# Patient Record
Sex: FEMALE | Race: BLACK OR AFRICAN AMERICAN | Employment: FULL TIME | URBAN - METROPOLITAN AREA
[De-identification: names, ages, dates, MRNs, and addresses within clinical notes are randomized per-mention and may not be internally consistent; named-entity substitution may affect disease eponyms.]

---

## 2017-02-13 ENCOUNTER — OFFICE VISIT (OUTPATIENT)
Dept: FAMILY MEDICINE CLINIC | Age: 42
End: 2017-02-13

## 2017-02-13 VITALS
OXYGEN SATURATION: 96 % | DIASTOLIC BLOOD PRESSURE: 74 MMHG | HEART RATE: 109 BPM | HEIGHT: 63 IN | SYSTOLIC BLOOD PRESSURE: 138 MMHG | BODY MASS INDEX: 27.11 KG/M2 | WEIGHT: 153 LBS

## 2017-02-13 DIAGNOSIS — F41.9 ANXIETY AND DEPRESSION: ICD-10-CM

## 2017-02-13 DIAGNOSIS — Z00.00 HEALTHCARE MAINTENANCE: Primary | ICD-10-CM

## 2017-02-13 DIAGNOSIS — F98.8 ADD (ATTENTION DEFICIT DISORDER): ICD-10-CM

## 2017-02-13 DIAGNOSIS — N89.8 VAGINAL DISCHARGE: ICD-10-CM

## 2017-02-13 DIAGNOSIS — B00.9 HERPES: ICD-10-CM

## 2017-02-13 DIAGNOSIS — F32.A ANXIETY AND DEPRESSION: ICD-10-CM

## 2017-02-13 DIAGNOSIS — Q18.1: ICD-10-CM

## 2017-02-13 DIAGNOSIS — N94.9 VAGINAL DISCOMFORT: ICD-10-CM

## 2017-02-13 DIAGNOSIS — Z00.00 HEALTHCARE MAINTENANCE: ICD-10-CM

## 2017-02-13 DIAGNOSIS — E55.9 VITAMIN D DEFICIENCY: ICD-10-CM

## 2017-02-13 DIAGNOSIS — N63.0 BREAST LUMP: ICD-10-CM

## 2017-02-13 DIAGNOSIS — M79.645 PAIN OF FINGER OF LEFT HAND: ICD-10-CM

## 2017-02-13 DIAGNOSIS — I10 ESSENTIAL HYPERTENSION: ICD-10-CM

## 2017-02-13 LAB
BILIRUBIN, POC: NORMAL
BLOOD URINE, POC: NORMAL
CLARITY, POC: CLEAR
COLOR, POC: NORMAL
GLUCOSE URINE, POC: NORMAL
KETONES, POC: NORMAL
LEUKOCYTE EST, POC: NORMAL
NITRITE, POC: NORMAL
PH, POC: 5
PROTEIN, POC: NORMAL
SPECIFIC GRAVITY, POC: 1.03
UROBILINOGEN, POC: 0.2

## 2017-02-13 PROCEDURE — 81002 URINALYSIS NONAUTO W/O SCOPE: CPT | Performed by: FAMILY MEDICINE

## 2017-02-13 PROCEDURE — 99396 PREV VISIT EST AGE 40-64: CPT | Performed by: FAMILY MEDICINE

## 2017-02-13 PROCEDURE — 81003 URINALYSIS AUTO W/O SCOPE: CPT | Performed by: FAMILY MEDICINE

## 2017-02-13 RX ORDER — ERGOCALCIFEROL 1.25 MG/1
50000 CAPSULE ORAL WEEKLY
COMMUNITY

## 2017-02-13 RX ORDER — HYDROCHLOROTHIAZIDE 12.5 MG/1
12.5 TABLET ORAL DAILY
Qty: 90 TABLET | Refills: 2 | Status: SHIPPED | OUTPATIENT
Start: 2017-02-13 | End: 2018-08-14 | Stop reason: SDUPTHER

## 2017-02-13 RX ORDER — FLUCONAZOLE 150 MG/1
150 TABLET ORAL ONCE
Qty: 2 TABLET | Refills: 0 | Status: SHIPPED | OUTPATIENT
Start: 2017-02-13 | End: 2017-02-13

## 2017-02-14 LAB
ANION GAP SERPL CALCULATED.3IONS-SCNC: 17 MMOL/L (ref 3–16)
BACTERIA: ABNORMAL /HPF
BILIRUBIN URINE: NEGATIVE
BLOOD, URINE: ABNORMAL
BUN BLDV-MCNC: 10 MG/DL (ref 7–20)
CALCIUM SERPL-MCNC: 9.1 MG/DL (ref 8.3–10.6)
CHLORIDE BLD-SCNC: 101 MMOL/L (ref 99–110)
CHOLESTEROL, TOTAL: 140 MG/DL (ref 0–199)
CLARITY: ABNORMAL
CO2: 23 MMOL/L (ref 21–32)
COLOR: ABNORMAL
COMMENT UA: ABNORMAL
CREAT SERPL-MCNC: 0.7 MG/DL (ref 0.6–1.1)
EPITHELIAL CELLS, UA: 23 /HPF (ref 0–5)
GFR AFRICAN AMERICAN: >60
GFR NON-AFRICAN AMERICAN: >60
GLUCOSE BLD-MCNC: 87 MG/DL (ref 70–99)
GLUCOSE URINE: NEGATIVE MG/DL
HDLC SERPL-MCNC: 47 MG/DL (ref 40–60)
HYALINE CASTS: 10 /HPF (ref 0–8)
KETONES, URINE: NEGATIVE MG/DL
LDL CHOLESTEROL CALCULATED: 79 MG/DL
LEUKOCYTE ESTERASE, URINE: ABNORMAL
MICROSCOPIC EXAMINATION: YES
NITRITE, URINE: NEGATIVE
PH UA: 5.5
POTASSIUM SERPL-SCNC: 4.4 MMOL/L (ref 3.5–5.1)
PROTEIN UA: ABNORMAL MG/DL
RBC UA: 6 /HPF (ref 0–4)
SODIUM BLD-SCNC: 141 MMOL/L (ref 136–145)
SPECIFIC GRAVITY UA: >1.03
TRIGL SERPL-MCNC: 70 MG/DL (ref 0–150)
URINE TYPE: ABNORMAL
UROBILINOGEN, URINE: 0.2 E.U./DL
VITAMIN D 25-HYDROXY: 38.5 NG/ML
VLDLC SERPL CALC-MCNC: 14 MG/DL
WBC UA: 8 /HPF (ref 0–5)

## 2017-02-15 LAB — URINE CULTURE, ROUTINE: NORMAL

## 2017-02-16 DIAGNOSIS — R31.29 MICROSCOPIC HEMATURIA: Primary | ICD-10-CM

## 2017-02-21 ENCOUNTER — TELEPHONE (OUTPATIENT)
Dept: FAMILY MEDICINE CLINIC | Age: 42
End: 2017-02-21

## 2017-02-21 DIAGNOSIS — N64.4 MASTALGIA: Primary | ICD-10-CM

## 2017-02-21 DIAGNOSIS — N63.0 BREAST LUMP: ICD-10-CM

## 2017-02-21 DIAGNOSIS — Z00.00 HEALTHCARE MAINTENANCE: ICD-10-CM

## 2017-03-10 ENCOUNTER — HOSPITAL ENCOUNTER (OUTPATIENT)
Dept: ULTRASOUND IMAGING | Age: 42
Discharge: HOME | End: 2017-03-10
Attending: FAMILY MEDICINE | Admitting: FAMILY MEDICINE

## 2017-03-10 DIAGNOSIS — N63.0 BREAST LUMP: ICD-10-CM

## 2017-03-10 DIAGNOSIS — N64.4 MASTALGIA: ICD-10-CM

## 2017-03-10 DIAGNOSIS — R52 PAIN: ICD-10-CM

## 2017-07-20 LAB
ALBUMIN SERPL-MCNC: 4.1 G/DL
ALP BLD-CCNC: 48 U/L
ALT SERPL-CCNC: 10 U/L
ANION GAP SERPL CALCULATED.3IONS-SCNC: NORMAL MMOL/L
AST SERPL-CCNC: 16 U/L
BASOPHILS ABSOLUTE: NORMAL /ΜL
BASOPHILS RELATIVE PERCENT: NORMAL %
BILIRUB SERPL-MCNC: 0.3 MG/DL (ref 0.1–1.4)
BUN BLDV-MCNC: 18 MG/DL
CALCIUM SERPL-MCNC: 8.7 MG/DL
CHLORIDE BLD-SCNC: 101 MMOL/L
CHOLESTEROL, TOTAL: 153 MG/DL
CHOLESTEROL/HDL RATIO: 2.8
CO2: NORMAL MMOL/L
CREAT SERPL-MCNC: 12 MG/DL
EOSINOPHILS ABSOLUTE: NORMAL /ΜL
EOSINOPHILS RELATIVE PERCENT: NORMAL %
GFR CALCULATED: 124
GLUCOSE BLD-MCNC: 97 MG/DL
HCT VFR BLD CALC: 39.5 % (ref 36–46)
HDLC SERPL-MCNC: 54 MG/DL (ref 35–70)
HEMOGLOBIN: 12.6 G/DL (ref 12–16)
LDL CHOLESTEROL CALCULATED: 86 MG/DL (ref 0–160)
LYMPHOCYTES ABSOLUTE: NORMAL /ΜL
LYMPHOCYTES RELATIVE PERCENT: NORMAL %
MCH RBC QN AUTO: 31 PG
MCHC RBC AUTO-ENTMCNC: 31.9 G/DL
MCV RBC AUTO: 97 FL
MONOCYTES ABSOLUTE: NORMAL /ΜL
MONOCYTES RELATIVE PERCENT: NORMAL %
NEUTROPHILS ABSOLUTE: NORMAL /ΜL
NEUTROPHILS RELATIVE PERCENT: NORMAL %
PLATELET # BLD: 223 K/ΜL
PMV BLD AUTO: NORMAL FL
POTASSIUM SERPL-SCNC: 3.9 MMOL/L
RBC # BLD: 4.07 10^6/ΜL
SODIUM BLD-SCNC: 141 MMOL/L
TOTAL PROTEIN: 7.6
TRIGL SERPL-MCNC: 64 MG/DL
VLDLC SERPL CALC-MCNC: 13 MG/DL
WBC # BLD: 4.9 10^3/ML

## 2018-08-14 ENCOUNTER — TELEPHONE (OUTPATIENT)
Dept: FAMILY MEDICINE CLINIC | Age: 43
End: 2018-08-14

## 2018-08-14 ENCOUNTER — OFFICE VISIT (OUTPATIENT)
Dept: FAMILY MEDICINE CLINIC | Age: 43
End: 2018-08-14

## 2018-08-14 VITALS
BODY MASS INDEX: 29.38 KG/M2 | SYSTOLIC BLOOD PRESSURE: 136 MMHG | HEART RATE: 66 BPM | WEIGHT: 165.8 LBS | DIASTOLIC BLOOD PRESSURE: 96 MMHG | HEIGHT: 63 IN | OXYGEN SATURATION: 97 %

## 2018-08-14 DIAGNOSIS — H61.21 IMPACTED CERUMEN OF RIGHT EAR: ICD-10-CM

## 2018-08-14 DIAGNOSIS — I10 ESSENTIAL HYPERTENSION: ICD-10-CM

## 2018-08-14 DIAGNOSIS — F32.A ANXIETY AND DEPRESSION: ICD-10-CM

## 2018-08-14 DIAGNOSIS — F98.8 ATTENTION DEFICIT DISORDER, UNSPECIFIED HYPERACTIVITY PRESENCE: ICD-10-CM

## 2018-08-14 DIAGNOSIS — F41.9 ANXIETY AND DEPRESSION: ICD-10-CM

## 2018-08-14 DIAGNOSIS — M79.7 FIBROMYALGIA: ICD-10-CM

## 2018-08-14 DIAGNOSIS — Z00.00 HEALTHCARE MAINTENANCE: Primary | ICD-10-CM

## 2018-08-14 PROCEDURE — 99396 PREV VISIT EST AGE 40-64: CPT | Performed by: FAMILY MEDICINE

## 2018-08-14 RX ORDER — HYDROCHLOROTHIAZIDE 12.5 MG/1
12.5 TABLET ORAL DAILY
Qty: 90 TABLET | Refills: 1 | Status: SHIPPED | OUTPATIENT
Start: 2018-08-14 | End: 2018-09-13 | Stop reason: ALTCHOICE

## 2018-08-14 RX ORDER — NABUMETONE 750 MG/1
TABLET, FILM COATED ORAL
Refills: 2 | COMMUNITY
Start: 2018-08-13 | End: 2018-09-13 | Stop reason: ALTCHOICE

## 2018-08-14 RX ORDER — LORAZEPAM 0.5 MG/1
0.5 TABLET ORAL EVERY 6 HOURS PRN
Qty: 20 TABLET | Refills: 0 | Status: SHIPPED | OUTPATIENT
Start: 2018-08-14 | End: 2020-11-30 | Stop reason: SDUPTHER

## 2018-08-14 ASSESSMENT — PATIENT HEALTH QUESTIONNAIRE - PHQ9
SUM OF ALL RESPONSES TO PHQ QUESTIONS 1-9: 2
2. FEELING DOWN, DEPRESSED OR HOPELESS: 1
SUM OF ALL RESPONSES TO PHQ9 QUESTIONS 1 & 2: 2
1. LITTLE INTEREST OR PLEASURE IN DOING THINGS: 1
SUM OF ALL RESPONSES TO PHQ QUESTIONS 1-9: 2

## 2018-08-14 NOTE — TELEPHONE ENCOUNTER
Patients blood pressure has been running high lately. Yesterday she had a BP reading of 152/112 while at her rheumatologist. She monitors her BP at home as well and she says that the \"bottom number keeps creeping up. \" Denies chest pain or shortness of breath, but does feel lightheaded. She is currently out of her medication and is requesting either an earlier appointment or a 1 week supply of hydrochlorothiazide, although she does not feel like it has been working. She is currently scheduled for 8/21. Please advise.     CB: [phone]

## 2018-08-14 NOTE — PROGRESS NOTES
LORazepam (ATIVAN) 0.5 MG tablet Take 1 tablet by mouth every 6 hours as needed for Anxiety for up to 30 days. . 20 tablet 0    hydrochlorothiazide (HYDRODIURIL) 12.5 MG tablet Take 1 tablet by mouth daily 90 tablet 1    famciclovir (FAMVIR) 500 MG tablet TAKE 1 TABLET BY MOUTH ONE TIME A DAY  30 tablet 2    vitamin D (ERGOCALCIFEROL) 99104 UNITS CAPS capsule Take 50,000 Units by mouth once a week      fluticasone (FLONASE) 50 MCG/ACT nasal spray 2 sprays by Nasal route daily. 1 Bottle 11    pseudoephedrine (SUDAFED 12 HOUR) 120 MG CR tablet Take 1 tablet by mouth 2 times daily as needed for Congestion. 30 tablet 1    IBUPROFEN PO Take  by mouth. Past Medical History:   Diagnosis Date    Anxiety     Depression     Genital herpes     Hypertension     Infertility, female     has looked into adoption in the past, hx of evaluation but no hx of IVF    Rash     Right tennis elbow     f/u with ortho     No past surgical history on file. Family History   Problem Relation Age of Onset    Diabetes Maternal Grandmother      Social History     Social History    Marital status:      Spouse name: N/A    Number of children: N/A    Years of education: N/A     Occupational History    Not on file. Social History Main Topics    Smoking status: Never Smoker    Smokeless tobacco: Never Used    Alcohol use Yes      Comment: occasional    Drug use: No    Sexual activity: Not on file     Other Topics Concern    Not on file     Social History Narrative    03/19/2014     back home and no longer working. Works at [employer].  works out of town at [employer] and comes home for short periods of times. Review of Systems:  A comprehensive review of systems was negative except for what was noted in the HPI.      Physical Exam:   Vitals:    08/14/18 1154   BP: (!) 136/90   Site: Left Arm   Position: Sitting   Cuff Size: Medium Adult   Pulse: 66   SpO2: 97%   Weight: 165 lb 12.8 oz (75.2 kg)   Height: 5' 3\" (1.6 m)     Body mass index is 29.37 kg/m². Constitutional: She is oriented to person, place, and time. She appears well-developed and well-nourished. No distress. HEENT:   Head: Normocephalic and atraumatic. Right Ear: + R cerumen impaction  Left Ear: Tympanic membrane, external ear and ear canal normal.   Nose: Nose normal.   Mouth/Throat: Oropharynx is clear and moist, and mucous membranes are normal.  There is no cervical adenopathy. Eyes: Conjunctivae and extraocular motions are normal. Pupils are equal, round, and reactive to light. Neck: Neck supple. No mass and no thyromegaly present. Cardiovascular: Normal rate, regular rhythm, normal heart sounds. Exam reveals no gallop and no friction rub. No murmur heard. Pulmonary/Chest: Effort normal and breath sounds normal. No respiratory distress. She has no wheezes, rhonchi or rales. Breast exam: fibrocystic changes bilateral breast R more then L. No axillary LAD. Abdominal: Soft, non-tender. Bowel sounds are normal. She exhibits no palpable organomegaly or mass. Musculoskeletal: Normal range of motion. She exhibits no edema. Neurological: She is alert and oriented. No cranial nerve deficit. Coordination normal.   Skin: Skin is warm and dry. There is no rash or erythema. Psychiatric: She has a normal mood and affect. Her speech is normal and behavior is normal. Judgment, cognition and memory are normal.       Assessment/Plan     1. Healthcare maintenance  Annual physical exam done today. Counseled on preventative care and a healthy lifestlye. Discussed optimal calcium intake emphasizing the importance of getting as much of the daily calcium requirement as possible from dietary sources. Immunization history reviewed. Cont f/u with GYN. 2. Essential hypertension  Restart HCTZ. Monitor.  Discussed risks, benefits, and potential side effects of medication and patient demonstrates understanding.    -

## 2018-08-14 NOTE — PATIENT INSTRUCTIONS
maintaining bone mass. Vitamin D and magnesium are also needed to aid in calcium absorption. Although you must work at it, it is possible to get adequate amounts of calcium from your diet. Dairy products are the best dietary sources of calcium. Other good sources include tofu, salmon (canned with edible bones), and blackstrap molasses. If you cannot or do not regularly get enough calcium from your diet, you may need to take a calcium supplement. However, supplements should be used only to supplement the calcium in your diet, not replace it. There are several different calcium compounds on the market. They differ mainly in price and how easily they are absorbed. Be sure to discuss calcium supplementation with your doctor. General recommendations are:   · Women   ¨ 19-50     1,000 milligrams (mg)   ¨ 51 and older    1,200 mg   · Men   ¨ 19-50    1,000 mg   ¨ 51-70    1,000 mg   ¨ Over 70    1,200 mg   Vitamin D is also important. . Our usual source of vitamin D is sunlight. But, with so many of us spending abundant time indoors (and wisely using sunscreen when outdoors), few people get adequate sun exposureespecially during the wintertime. [place] and other types of fish (like mackeral and sardines) are good vitamin D sources. Also, adding vitamin D-fortified milk to your diet will help you meet your daily needs. . The recommendation for most people (aged 1-70 years) is to get 600 international units (IU) of vitamin D each day. If you want to check on your vitamin D status, talk to your doctor, who will be able to do a blood test.   Exercise   Exercise is an important contributor to building and maintaining bone mass at all ages. It also increases the strength and coordination of muscles that support the bones. Weight-bearing exercise , such as walking , jogging, stair climbing, jumping rope, and dancing, is the best for your bones.  Weight lifting has also been shown to help strengthen bones and prevent

## 2018-09-07 ENCOUNTER — TELEPHONE (OUTPATIENT)
Dept: FAMILY MEDICINE CLINIC | Age: 43
End: 2018-09-07

## 2018-09-07 RX ORDER — AMLODIPINE BESYLATE 5 MG/1
5 TABLET ORAL DAILY
Qty: 30 TABLET | Refills: 0 | Status: SHIPPED | OUTPATIENT
Start: 2018-09-07 | End: 2018-10-08 | Stop reason: SDUPTHER

## 2018-09-07 NOTE — TELEPHONE ENCOUNTER
Pt was seen 8/14 and given hydrochlorothiazide (HYDRODIURIL) 12.5 MG tablet   Pt says BP is still 150/100, pt was told to go to ED with symptoms of Headache/SOB dizziness or worsening BP numbers  Pt was advised Dr. Kapil Hernandez until Monday  Cb# [phone]

## 2018-09-07 NOTE — TELEPHONE ENCOUNTER
[phone] ([place])   Left  w/ detailed instructions for patient. Asked patient to call back to confirm message was received and to schedule f/up appt for next week.

## 2018-09-11 LAB
HPV COMMENT: NORMAL
HPV TYPE 16: NOT DETECTED
HPV TYPE 18: NOT DETECTED
HPVOH (OTHER TYPES): NOT DETECTED

## 2018-09-13 ENCOUNTER — OFFICE VISIT (OUTPATIENT)
Dept: FAMILY MEDICINE CLINIC | Age: 43
End: 2018-09-13

## 2018-09-13 VITALS
DIASTOLIC BLOOD PRESSURE: 80 MMHG | OXYGEN SATURATION: 98 % | SYSTOLIC BLOOD PRESSURE: 130 MMHG | HEART RATE: 79 BPM | BODY MASS INDEX: 29.16 KG/M2 | HEIGHT: 63 IN | WEIGHT: 164.6 LBS

## 2018-09-13 DIAGNOSIS — R07.89 CHEST TIGHTNESS: ICD-10-CM

## 2018-09-13 DIAGNOSIS — F41.9 ANXIETY AND DEPRESSION: ICD-10-CM

## 2018-09-13 DIAGNOSIS — I10 ESSENTIAL HYPERTENSION: Primary | ICD-10-CM

## 2018-09-13 DIAGNOSIS — F32.A ANXIETY AND DEPRESSION: ICD-10-CM

## 2018-09-13 DIAGNOSIS — M79.7 FIBROMYALGIA: ICD-10-CM

## 2018-09-13 DIAGNOSIS — F98.8 ATTENTION DEFICIT DISORDER, UNSPECIFIED HYPERACTIVITY PRESENCE: ICD-10-CM

## 2018-09-13 PROCEDURE — 99214 OFFICE O/P EST MOD 30 MIN: CPT | Performed by: FAMILY MEDICINE

## 2018-09-13 RX ORDER — MELOXICAM 15 MG/1
15 TABLET ORAL
COMMUNITY
Start: 2018-08-22 | End: 2020-11-30

## 2018-09-13 RX ORDER — MILNACIPRAN HYDROCHLORIDE 12.5-25-5
KIT ORAL
COMMUNITY
Start: 2018-08-30 | End: 2018-11-26 | Stop reason: ALTCHOICE

## 2018-09-13 RX ORDER — SERTRALINE HYDROCHLORIDE 25 MG/1
12.5-25 TABLET, FILM COATED ORAL DAILY
Qty: 30 TABLET | Refills: 3 | Status: SHIPPED | OUTPATIENT
Start: 2018-09-13 | End: 2019-01-14 | Stop reason: SDUPTHER

## 2018-09-13 NOTE — PROGRESS NOTES
Can restart the HCTZ and continue the norvasc. Risks of low bp discussed. Follow up if symptoms worsen or fail to improve. 2. Anxiety and depression  Reviewed note from patient's psychologist back in 2016. They recommended starting with trial of a medication for anxiety and depression prior to medication for ADD. Pt will try zoloft. Discussed risks, benefits, and potential side effects of medication and patient demonstrates understanding. She is fearful of the start up side effects so will start 12.5mg daily and then go up to 25 mg daily. discused may need a higher dose. She will also try to get in with psych.    - Ambulatory referral to Psychiatry    3. Attention deficit disorder, unspecified hyperactivity presence  See above. - Ambulatory referral to Psychiatry    4. Chest tightness  Declined an EKG. Return precautions reviewed. Go to ER or fu if symptoms recur. 5. Fibromyalgia  Continue rheum fu. Discussed medications with patient, who voiced understanding of their use and indications. All questions answered. Return in about 2 months (around 11/13/2018) for Chronic conditions, HTN.

## 2018-10-08 RX ORDER — AMLODIPINE BESYLATE 5 MG/1
5 TABLET ORAL DAILY
Qty: 30 TABLET | Refills: 0 | Status: SHIPPED | OUTPATIENT
Start: 2018-10-08 | End: 2018-10-24 | Stop reason: SDUPTHER

## 2018-10-24 NOTE — TELEPHONE ENCOUNTER
Medication:   Requested Prescriptions     Pending Prescriptions Disp Refills    amLODIPine (NORVASC) 5 MG tablet [Pharmacy Med Name: AMLODIPINE BESYLATE 5 MG TAB] 30 tablet 0     Sig: TAKE 1 TABLET BY MOUTH EVERY DAY        Last Filled:  10.08.2018 #30    Patient Phone Number: [phone] (home)      Last appt: 9/13/2018  Return in about 2 months (around 11/13/2018) for Chronic conditions, HTN. Next appt: 11.15.2018    Last OARRS:   RX Monitoring 9/13/2018   Attestation The Prescription Monitoring Report for this patient was reviewed today. Documentation Possible medication side effects, risk of tolerance/dependence & alternative treatments discussed. ;No signs of potential drug abuse or diversion identified.        Preferred Pharmacy:   [facility]/pharmacy [address] [place] [address],  Albertoflor [address] [place] [phone]  [address] [place] [address]  Phone: [phone] Fax: [phone]

## 2018-10-25 RX ORDER — AMLODIPINE BESYLATE 5 MG/1
TABLET ORAL
Qty: 30 TABLET | Refills: 0 | Status: SHIPPED | OUTPATIENT
Start: 2018-10-25 | End: 2018-10-29 | Stop reason: SDUPTHER

## 2018-10-29 DIAGNOSIS — I10 HYPERTENSION, UNSPECIFIED TYPE: Primary | ICD-10-CM

## 2018-10-29 RX ORDER — AMLODIPINE BESYLATE 5 MG/1
TABLET ORAL
Qty: 90 TABLET | Refills: 0 | Status: SHIPPED | OUTPATIENT
Start: 2018-10-29 | End: 2019-02-12 | Stop reason: SDUPTHER

## 2018-11-14 RX ORDER — DULOXETIN HYDROCHLORIDE 30 MG/1
CAPSULE, DELAYED RELEASE ORAL
Refills: 1 | COMMUNITY
Start: 2018-10-17 | End: 2020-11-30

## 2018-11-14 RX ORDER — GABAPENTIN 100 MG/1
CAPSULE ORAL
Refills: 2 | COMMUNITY
Start: 2018-10-11 | End: 2018-11-26

## 2018-11-26 ENCOUNTER — OFFICE VISIT (OUTPATIENT)
Dept: FAMILY MEDICINE CLINIC | Age: 43
End: 2018-11-26
Payer: COMMERCIAL

## 2018-11-26 VITALS
HEIGHT: 63 IN | WEIGHT: 164.8 LBS | OXYGEN SATURATION: 97 % | BODY MASS INDEX: 29.2 KG/M2 | RESPIRATION RATE: 12 BRPM | DIASTOLIC BLOOD PRESSURE: 78 MMHG | SYSTOLIC BLOOD PRESSURE: 120 MMHG | HEART RATE: 87 BPM

## 2018-11-26 DIAGNOSIS — L85.3 DRY SKIN: Primary | ICD-10-CM

## 2018-11-26 DIAGNOSIS — R31.29 MICROSCOPIC HEMATURIA: ICD-10-CM

## 2018-11-26 DIAGNOSIS — I10 ESSENTIAL HYPERTENSION: ICD-10-CM

## 2018-11-26 DIAGNOSIS — Z00.00 HEALTHCARE MAINTENANCE: ICD-10-CM

## 2018-11-26 DIAGNOSIS — B00.9 HERPES: ICD-10-CM

## 2018-11-26 DIAGNOSIS — M79.7 FIBROMYALGIA: ICD-10-CM

## 2018-11-26 PROCEDURE — 99214 OFFICE O/P EST MOD 30 MIN: CPT | Performed by: FAMILY MEDICINE

## 2018-11-26 RX ORDER — HYDROCHLOROTHIAZIDE 12.5 MG/1
12.5 TABLET ORAL DAILY
Qty: 90 TABLET | Refills: 0
Start: 2018-11-26 | End: 2019-05-20

## 2018-11-26 RX ORDER — FAMCICLOVIR 500 MG/1
TABLET, FILM COATED ORAL
Qty: 30 TABLET | Refills: 2 | Status: SHIPPED | OUTPATIENT
Start: 2018-11-26 | End: 2019-04-03 | Stop reason: SDUPTHER

## 2018-11-26 NOTE — PROGRESS NOTES
occurs in the fall. Using benadryl prn at night. Got a lotion from [employer]. It has improved today. No rash currently. Sometimes gets a little red spot that goes away after awhile. Using aveeno soap and jet cream. Has tried all sorts of products. She is content with her current regimen. Fibromyalgia: Improving. Seeing rheumatology. Gabapentin made her feel sick. On cymbalta currently as well. Planning to try higher dose as rheum rec.      Anxiety/ADD:  Anxiety comes if she hasn't eaten or has indigestion. S/p evaluation by psychologist 2016- media. They recommended trial of treatment for anxiety and depression and then seeing how the inattention is doing. Takes the ativan prn- uses very sparingly. Hx of seeing therapist which didn't go well.      HM: not sexually active. Having issues with  currently. Has a GYN.        Social History     Social History    Marital status:      Spouse name: N/A    Number of children: N/A    Years of education: N/A     Occupational History    Not on file. Social History Main Topics    Smoking status: Never Smoker    Smokeless tobacco: Never Used    Alcohol use Yes      Comment: occasional    Drug use: No    Sexual activity: Not on file     Other Topics Concern    Not on file     Social History Narrative    03/19/2014     back home and no longer working. Works at [employer].  works out of town at [employer] and comes home for short periods of times. Review of Systems  As documented in the HPI. No KATARINA. No CP- resolved since last visit. Physical Exam   Constitutional: She appears well-developed and well-nourished. No distress. HENT:   Head: Normocephalic and atraumatic. Cardiovascular: Normal rate, regular rhythm and normal heart sounds. No murmur heard. Pulmonary/Chest: Effort normal and breath sounds normal. No respiratory distress. Neurological: She is alert. Skin: Skin is warm and dry.    Small well

## 2019-01-14 RX ORDER — SERTRALINE HYDROCHLORIDE 25 MG/1
25 TABLET, FILM COATED ORAL DAILY
Qty: 30 TABLET | Refills: 3 | Status: SHIPPED | OUTPATIENT
Start: 2019-01-14 | End: 2019-01-15 | Stop reason: SDUPTHER

## 2019-01-17 RX ORDER — SERTRALINE HYDROCHLORIDE 25 MG/1
25 TABLET, FILM COATED ORAL DAILY
Qty: 90 TABLET | Refills: 1 | Status: SHIPPED | OUTPATIENT
Start: 2019-01-17 | End: 2019-09-12 | Stop reason: SDUPTHER

## 2019-02-12 DIAGNOSIS — I10 HYPERTENSION, UNSPECIFIED TYPE: ICD-10-CM

## 2019-02-14 RX ORDER — AMLODIPINE BESYLATE 5 MG/1
TABLET ORAL
Qty: 90 TABLET | Refills: 1 | Status: SHIPPED | OUTPATIENT
Start: 2019-02-14 | End: 2019-12-06 | Stop reason: SDUPTHER

## 2019-04-03 DIAGNOSIS — B00.9 HERPES: ICD-10-CM

## 2019-04-04 RX ORDER — FAMCICLOVIR 500 MG/1
500 TABLET, FILM COATED ORAL DAILY
Qty: 30 TABLET | Refills: 1 | Status: SHIPPED | OUTPATIENT
Start: 2019-04-04 | End: 2020-01-23 | Stop reason: SDUPTHER

## 2019-07-12 LAB
BUN BLDV-MCNC: 12 MG/DL
CALCIUM SERPL-MCNC: 9 MG/DL
CHLORIDE BLD-SCNC: 99 MMOL/L
CHOLESTEROL, TOTAL: 177 MG/DL
CHOLESTEROL/HDL RATIO: 3.5
CO2: NORMAL MMOL/L
CREAT SERPL-MCNC: 0.88 MG/DL
GFR CALCULATED: NORMAL
GLUCOSE BLD-MCNC: 91 MG/DL
HDLC SERPL-MCNC: 51 MG/DL (ref 35–70)
LDL CHOLESTEROL CALCULATED: 114 MG/DL (ref 0–160)
POTASSIUM SERPL-SCNC: 3.7 MMOL/L
SODIUM BLD-SCNC: 138 MMOL/L
TRIGL SERPL-MCNC: 62 MG/DL
VLDLC SERPL CALC-MCNC: 12 MG/DL

## 2019-07-24 DIAGNOSIS — Z00.00 HEALTHCARE MAINTENANCE: ICD-10-CM

## 2019-09-06 ENCOUNTER — OFFICE VISIT (OUTPATIENT)
Dept: FAMILY MEDICINE CLINIC | Age: 44
End: 2019-09-06
Payer: COMMERCIAL

## 2019-09-06 VITALS
WEIGHT: 167 LBS | OXYGEN SATURATION: 98 % | HEART RATE: 66 BPM | SYSTOLIC BLOOD PRESSURE: 140 MMHG | BODY MASS INDEX: 29.59 KG/M2 | DIASTOLIC BLOOD PRESSURE: 92 MMHG | HEIGHT: 63 IN

## 2019-09-06 DIAGNOSIS — I10 HYPERTENSION, UNSPECIFIED TYPE: Primary | ICD-10-CM

## 2019-09-06 DIAGNOSIS — E78.00 ELEVATED LDL CHOLESTEROL LEVEL: ICD-10-CM

## 2019-09-06 DIAGNOSIS — H61.21 IMPACTED CERUMEN OF RIGHT EAR: ICD-10-CM

## 2019-09-06 PROCEDURE — 99214 OFFICE O/P EST MOD 30 MIN: CPT | Performed by: NURSE PRACTITIONER

## 2019-09-06 RX ORDER — GABAPENTIN 100 MG/1
100 CAPSULE ORAL
COMMUNITY
Start: 2019-02-26

## 2019-09-06 ASSESSMENT — ENCOUNTER SYMPTOMS
ABDOMINAL PAIN: 0
SHORTNESS OF BREATH: 0

## 2019-09-06 NOTE — PROGRESS NOTES
15 MG tablet Take 15 mg by mouth Yes Historical Provider, MD   vitamin D (ERGOCALCIFEROL) 96594 UNITS CAPS capsule Take 50,000 Units by mouth once a week Yes Historical Provider, MD   fluticasone (FLONASE) 50 MCG/ACT nasal spray 2 sprays by Nasal route daily. Yes Timbo Richardson MD   pseudoephedrine (SUDAFED 12 HOUR) 120 MG CR tablet Take 1 tablet by mouth 2 times daily as needed for Congestion. Yes Timbo Richardson MD        Social History     Tobacco Use    Smoking status: Never Smoker    Smokeless tobacco: Never Used   Substance Use Topics    Alcohol use: Yes     Comment: occasional        Vitals:    09/06/19 0853 09/06/19 0915   BP: (!) 142/92 (!) 140/92   Site: Right Upper Arm Left Upper Arm   Position: Sitting Sitting   Cuff Size: Large Adult Large Adult   Pulse: 66    SpO2: 98%    Weight: 167 lb (75.8 kg)    Height: 5' 3\" (1.6 m)      Estimated body mass index is 29.58 kg/m² as calculated from the following:    Height as of this encounter: 5' 3\" (1.6 m). Weight as of this encounter: 167 lb (75.8 kg). Physical Exam   Constitutional: She is oriented to person, place, and time. She appears well-developed and well-nourished. HENT:   Head: Normocephalic. Right Ear: External ear and ear canal normal.   Left Ear: Tympanic membrane, external ear and ear canal normal.   Nose: Nose normal.   Mouth/Throat: Uvula is midline, oropharynx is clear and moist and mucous membranes are normal.   Cerumen impaction R side   Cardiovascular: Normal rate, regular rhythm, normal heart sounds and normal pulses. Pulmonary/Chest: Effort normal and breath sounds normal.   Neurological: She is alert and oriented to person, place, and time. Skin: Skin is warm. Psychiatric: She has a normal mood and affect. ASSESSMENT/PLAN:  1. Hypertension, unspecified type  Stable; Ok to stop hctz. Will trial Norvasc alone. Patient to begin checking    2.  Impacted cerumen of right ear  Stable;  Irrigation completed in the

## 2019-09-12 RX ORDER — SERTRALINE HYDROCHLORIDE 25 MG/1
TABLET, FILM COATED ORAL
Qty: 90 TABLET | Refills: 0 | Status: SHIPPED | OUTPATIENT
Start: 2019-09-12 | End: 2020-11-30

## 2019-09-27 ENCOUNTER — OFFICE VISIT (OUTPATIENT)
Dept: FAMILY MEDICINE CLINIC | Age: 44
End: 2019-09-27
Payer: COMMERCIAL

## 2019-09-27 VITALS
HEIGHT: 63 IN | TEMPERATURE: 98.5 F | HEART RATE: 87 BPM | DIASTOLIC BLOOD PRESSURE: 96 MMHG | BODY MASS INDEX: 30.48 KG/M2 | OXYGEN SATURATION: 95 % | WEIGHT: 172 LBS | SYSTOLIC BLOOD PRESSURE: 134 MMHG

## 2019-09-27 DIAGNOSIS — I10 ESSENTIAL HYPERTENSION: ICD-10-CM

## 2019-09-27 DIAGNOSIS — B96.89 ACUTE BACTERIAL SINUSITIS: Primary | ICD-10-CM

## 2019-09-27 DIAGNOSIS — J01.90 ACUTE BACTERIAL SINUSITIS: Primary | ICD-10-CM

## 2019-09-27 DIAGNOSIS — R05.9 COUGH: ICD-10-CM

## 2019-09-27 DIAGNOSIS — M79.7 FIBROMYALGIA: ICD-10-CM

## 2019-09-27 PROCEDURE — 99214 OFFICE O/P EST MOD 30 MIN: CPT | Performed by: NURSE PRACTITIONER

## 2019-09-27 RX ORDER — BENZONATATE 100 MG/1
100-200 CAPSULE ORAL 3 TIMES DAILY PRN
Qty: 30 CAPSULE | Refills: 0 | Status: SHIPPED | OUTPATIENT
Start: 2019-09-27 | End: 2020-11-30

## 2019-09-27 RX ORDER — FLUTICASONE PROPIONATE 50 MCG
2 SPRAY, SUSPENSION (ML) NASAL DAILY
Qty: 1 BOTTLE | Refills: 11 | Status: SHIPPED | OUTPATIENT
Start: 2019-09-27 | End: 2020-04-17 | Stop reason: SDUPTHER

## 2019-09-27 RX ORDER — AZITHROMYCIN 250 MG/1
250 TABLET, FILM COATED ORAL SEE ADMIN INSTRUCTIONS
Qty: 6 TABLET | Refills: 0 | Status: SHIPPED | OUTPATIENT
Start: 2019-09-27 | End: 2019-10-02

## 2019-09-27 ASSESSMENT — ENCOUNTER SYMPTOMS
COUGH: 1
WHEEZING: 0
SINUS PRESSURE: 1
SHORTNESS OF BREATH: 0

## 2019-09-27 NOTE — PROGRESS NOTES
by mouth See Admin Instructions for 5 days 500mg on day 1 followed by 250mg on days 2 - 5  Dispense: 6 tablet; Refill: 0    2. Cough  Stable;  Begin tessalon perles. Discussed can try warm tea with honey. Encourage plenty of fluids. - benzonatate (TESSALON) 100 MG capsule; Take 1-2 capsules by mouth 3 times daily as needed for Cough  Dispense: 30 capsule; Refill: 0    3. Fibromyalgia  Stable;  Patient requesting Vitamin D lab. - Vitamin D 25 Hydroxy; Future    4. Essential hypertension  Stable;  Discussed stop anything medication with a \"D\". Continue current regimen. Follow Up:     Return if symptoms worsen or fail to improve.

## 2019-12-06 DIAGNOSIS — I10 HYPERTENSION, UNSPECIFIED TYPE: ICD-10-CM

## 2019-12-06 RX ORDER — AMLODIPINE BESYLATE 5 MG/1
TABLET ORAL
Qty: 90 TABLET | Refills: 1 | Status: SHIPPED | OUTPATIENT
Start: 2019-12-06 | End: 2020-03-05 | Stop reason: SDUPTHER

## 2020-01-23 RX ORDER — HYDROCHLOROTHIAZIDE 12.5 MG/1
TABLET ORAL
Qty: 90 TABLET | Refills: 0 | Status: SHIPPED | OUTPATIENT
Start: 2020-01-23 | End: 2020-04-29

## 2020-01-23 RX ORDER — FAMCICLOVIR 500 MG/1
500 TABLET, FILM COATED ORAL DAILY
Qty: 30 TABLET | Refills: 1 | Status: SHIPPED | OUTPATIENT
Start: 2020-01-23 | End: 2020-02-17

## 2020-01-23 NOTE — TELEPHONE ENCOUNTER
Medication:   Requested Prescriptions     Pending Prescriptions Disp Refills    famciclovir (FAMVIR) 500 MG tablet 30 tablet 1     Sig: Take 1 tablet by mouth daily    hydrochlorothiazide (HYDRODIURIL) 12.5 MG tablet 90 tablet 0        Last Filled:  [facility] 5/20/2019 #90 w/0 refills   Pt has an \" out break \" and needs Famvir to be sent to pharm in [address] [place] [address]. Last fill on Famvir was 4/4/2019 #30 w/1 refills     Patient Phone Number: [phone] (home)     Last appt: 9/27/2019 pt seen Marianne Garduno  Next appt: Visit date not found    Last OARRS:   RX Monitoring 9/13/2018   Attestation The Prescription Monitoring Report for this patient was reviewed today. Periodic Controlled Substance Monitoring Possible medication side effects, risk of tolerance/dependence & alternative treatments discussed. ;No signs of potential drug abuse or diversion identified.

## 2020-02-17 RX ORDER — FAMCICLOVIR 500 MG/1
TABLET, FILM COATED ORAL
Qty: 30 TABLET | Refills: 1 | Status: SHIPPED | OUTPATIENT
Start: 2020-02-17 | End: 2020-03-05 | Stop reason: SDUPTHER

## 2020-02-17 NOTE — TELEPHONE ENCOUNTER
Medication:   Requested Prescriptions     Pending Prescriptions Disp Refills    famciclovir (FAMVIR) 500 MG tablet [Pharmacy Med Name: FAMCICLOVIR 500 MG TABLET] 30 tablet 1     Sig: TAKE 1 TABLET BY MOUTH EVERY DAY        Last Filled:  1.23.2020 #30 w/ 1 refill     Patient Phone Number: [phone] (home)     Last appt: 09.27.2019  Next appt: Visit date not found    Last OARRS:   RX Monitoring 9/13/2018   Attestation The Prescription Monitoring Report for this patient was reviewed today. Periodic Controlled Substance Monitoring Possible medication side effects, risk of tolerance/dependence & alternative treatments discussed. ;No signs of potential drug abuse or diversion identified.

## 2020-03-05 ENCOUNTER — OFFICE VISIT (OUTPATIENT)
Dept: FAMILY MEDICINE CLINIC | Age: 45
End: 2020-03-05
Payer: COMMERCIAL

## 2020-03-05 VITALS
BODY MASS INDEX: 29.95 KG/M2 | WEIGHT: 169 LBS | DIASTOLIC BLOOD PRESSURE: 88 MMHG | TEMPERATURE: 99.3 F | HEART RATE: 96 BPM | OXYGEN SATURATION: 96 % | SYSTOLIC BLOOD PRESSURE: 136 MMHG | HEIGHT: 63 IN

## 2020-03-05 LAB
INFLUENZA A ANTIBODY: ABNORMAL
INFLUENZA B ANTIBODY: ABNORMAL
S PYO AG THROAT QL: NORMAL

## 2020-03-05 PROCEDURE — 87880 STREP A ASSAY W/OPTIC: CPT | Performed by: FAMILY MEDICINE

## 2020-03-05 PROCEDURE — 99213 OFFICE O/P EST LOW 20 MIN: CPT | Performed by: FAMILY MEDICINE

## 2020-03-05 PROCEDURE — 87804 INFLUENZA ASSAY W/OPTIC: CPT | Performed by: FAMILY MEDICINE

## 2020-03-05 RX ORDER — AMLODIPINE BESYLATE 5 MG/1
5 TABLET ORAL DAILY
Qty: 90 TABLET | Refills: 1 | Status: SHIPPED | OUTPATIENT
Start: 2020-03-05 | End: 2020-11-30 | Stop reason: SDUPTHER

## 2020-03-05 RX ORDER — FAMCICLOVIR 500 MG/1
TABLET, FILM COATED ORAL
Qty: 30 TABLET | Refills: 2 | Status: SHIPPED | OUTPATIENT
Start: 2020-03-05 | End: 2020-07-02

## 2020-03-05 RX ORDER — OSELTAMIVIR PHOSPHATE 75 MG/1
75 CAPSULE ORAL 2 TIMES DAILY
Qty: 10 CAPSULE | Refills: 0 | Status: SHIPPED | OUTPATIENT
Start: 2020-03-05 | End: 2020-03-10

## 2020-03-05 NOTE — PROGRESS NOTES
SUBJECTIVE:   Shereen Molina is a 53 y.o. female who present complaining of flu-like symptoms: fevers, chills, myalgias, congestion, sore throat and cough for 3 days. Denies dyspnea or wheezing. The patient denies any GI our  symptoms. Went to urgent care 2 days ago, tested neg for flu and positive for strep. Has been taking amoxicillin but does not feel it's helping. Still having fever to 101-103. Taking advil and decongestant as well, not helping much. ROS: Denies CP or SOB    OBJECTIVE:  Appears moderately ill but not toxic; temperature as noted in vitals. Ears normal. Throat and pharynx normal.  Neck supple. No adenopathy in the neck. Sinuses non tender. The chest is clear. ASSESSMENT:  1. Influenza A    2. Sorethroat    3. Flu-like symptoms    4. Herpes    5. Hypertension, unspecified type        PLAN:  Symptomatic therapy suggested: rest, increase fluids, OTC acetaminophen, ibuprofen, gradual reintroduction of usual activities, gradual reintroduction of usual diet and call prn if symptoms persist or worsen. Call or return to clinic prn if these symptoms worsen or fail to improve as anticipated. Call urgent care to verify that strep was indeed positive. If so, continue amoxicillin.  If was negative, ok to stop abx.    tamiflu given

## 2020-04-17 RX ORDER — FLUTICASONE PROPIONATE 50 MCG
2 SPRAY, SUSPENSION (ML) NASAL DAILY
Qty: 1 BOTTLE | Refills: 11 | Status: SHIPPED | OUTPATIENT
Start: 2020-04-17 | End: 2021-11-18 | Stop reason: SDUPTHER

## 2020-04-29 RX ORDER — HYDROCHLOROTHIAZIDE 12.5 MG/1
TABLET ORAL
Qty: 90 TABLET | Refills: 0 | Status: SHIPPED | OUTPATIENT
Start: 2020-04-29 | End: 2020-07-29

## 2020-07-02 RX ORDER — FAMCICLOVIR 500 MG/1
TABLET, FILM COATED ORAL
Qty: 30 TABLET | Refills: 0 | Status: SHIPPED | OUTPATIENT
Start: 2020-07-02 | End: 2020-08-07

## 2020-07-29 RX ORDER — HYDROCHLOROTHIAZIDE 12.5 MG/1
TABLET ORAL
Qty: 90 TABLET | Refills: 0 | Status: SHIPPED | OUTPATIENT
Start: 2020-07-29 | End: 2020-10-30

## 2020-07-29 NOTE — TELEPHONE ENCOUNTER
Medication:   Requested Prescriptions     Pending Prescriptions Disp Refills    hydrochlorothiazide (HYDRODIURIL) 12.5 MG tablet [Pharmacy Med Name: HYDROCHLOROTHIAZIDE 12.5 MG TB] 90 tablet 0     Sig: TAKE 1 TABLET BY MOUTH EVERY DAY        Last Filled:  4/29/2020 #90 w/0 refills     Patient Phone Number: [phone] (home)     Last appt: 3/5/2020   Next appt: Visit date not found    Last OARRS:   RX Monitoring 9/13/2018   Attestation The Prescription Monitoring Report for this patient was reviewed today. Periodic Controlled Substance Monitoring Possible medication side effects, risk of tolerance/dependence & alternative treatments discussed. ;No signs of potential drug abuse or diversion identified.

## 2020-08-07 RX ORDER — FAMCICLOVIR 500 MG/1
TABLET, FILM COATED ORAL
Qty: 30 TABLET | Refills: 0 | Status: SHIPPED | OUTPATIENT
Start: 2020-08-07 | End: 2020-09-11

## 2020-08-07 NOTE — TELEPHONE ENCOUNTER
Medication:   Requested Prescriptions     Pending Prescriptions Disp Refills    famciclovir (FAMVIR) 500 MG tablet [Pharmacy Med Name: Famciclovir Oral Tablet 500 MG] 30 tablet 0     Sig: TAKE 1 TABLET BY MOUTH ONE TIME A DAY        Last Filled:  7/2/2020 30 tabs 0 refills     Patient Phone Number: [phone] (home)     Last appt: 3/5/2020 no future scheduling information provided   Next appt: Visit date not found    Last OARRS:   RX Monitoring 9/13/2018   Attestation The Prescription Monitoring Report for this patient was reviewed today. Periodic Controlled Substance Monitoring Possible medication side effects, risk of tolerance/dependence & alternative treatments discussed. ;No signs of potential drug abuse or diversion identified.

## 2020-09-11 RX ORDER — FAMCICLOVIR 500 MG/1
TABLET, FILM COATED ORAL
Qty: 30 TABLET | Refills: 0 | Status: SHIPPED | OUTPATIENT
Start: 2020-09-11 | End: 2020-11-30 | Stop reason: SDUPTHER

## 2020-09-11 NOTE — TELEPHONE ENCOUNTER
Medication:   Requested Prescriptions     Pending Prescriptions Disp Refills    famciclovir (FAMVIR) 500 MG tablet [Pharmacy Med Name: Famciclovir Oral Tablet 500 MG] 30 tablet 0     Sig: TAKE 1 TABLET BY MOUTH ONE TIME A DAY        Last Filled:  8/7/2020 #30    Patient Phone Number: [phone] (home)     Last appt: 3/5/2020   Next appt: Visit date not found    Last OARRS:   RX Monitoring 9/13/2018   Attestation The Prescription Monitoring Report for this patient was reviewed today. Periodic Controlled Substance Monitoring Possible medication side effects, risk of tolerance/dependence & alternative treatments discussed. ;No signs of potential drug abuse or diversion identified.

## 2020-10-30 RX ORDER — HYDROCHLOROTHIAZIDE 12.5 MG/1
TABLET ORAL
Qty: 90 TABLET | Refills: 0 | Status: SHIPPED | OUTPATIENT
Start: 2020-10-30 | End: 2020-11-30 | Stop reason: SDUPTHER

## 2020-10-30 NOTE — TELEPHONE ENCOUNTER
Medication:   Requested Prescriptions     Pending Prescriptions Disp Refills    hydroCHLOROthiazide (HYDRODIURIL) 12.5 MG tablet [Pharmacy Med Name: HYDROCHLOROTHIAZIDE 12.5 MG TB] 90 tablet 0     Sig: TAKE 1 TABLET BY MOUTH EVERY DAY        Last Filled:  7/29/2020 90 tabs 0 refills     Patient Phone Number: [phone] (home)     Last appt: 3/5/2020   Next appt: Visit date not found    Last OARRS:   RX Monitoring 9/13/2018   Attestation The Prescription Monitoring Report for this patient was reviewed today. Periodic Controlled Substance Monitoring Possible medication side effects, risk of tolerance/dependence & alternative treatments discussed. ;No signs of potential drug abuse or diversion identified.

## 2020-11-30 ENCOUNTER — OFFICE VISIT (OUTPATIENT)
Dept: FAMILY MEDICINE CLINIC | Age: 45
End: 2020-11-30
Payer: COMMERCIAL

## 2020-11-30 VITALS
HEIGHT: 63 IN | BODY MASS INDEX: 28.7 KG/M2 | OXYGEN SATURATION: 98 % | SYSTOLIC BLOOD PRESSURE: 132 MMHG | DIASTOLIC BLOOD PRESSURE: 82 MMHG | WEIGHT: 162 LBS | HEART RATE: 86 BPM

## 2020-11-30 DIAGNOSIS — Z00.00 HEALTHCARE MAINTENANCE: ICD-10-CM

## 2020-11-30 DIAGNOSIS — R35.0 FREQUENCY OF URINATION: ICD-10-CM

## 2020-11-30 DIAGNOSIS — R42 DIZZINESS: ICD-10-CM

## 2020-11-30 PROBLEM — Q18.1: Status: RESOLVED | Noted: 2017-02-13 | Resolved: 2020-11-30

## 2020-11-30 LAB
A/G RATIO: 1.2 (ref 1.1–2.2)
ALBUMIN SERPL-MCNC: 4.1 G/DL (ref 3.4–5)
ALP BLD-CCNC: 42 U/L (ref 40–129)
ALT SERPL-CCNC: 14 U/L (ref 10–40)
ANION GAP SERPL CALCULATED.3IONS-SCNC: 9 MMOL/L (ref 3–16)
AST SERPL-CCNC: 19 U/L (ref 15–37)
BACTERIA: ABNORMAL /HPF
BASOPHILS ABSOLUTE: 0 K/UL (ref 0–0.2)
BASOPHILS RELATIVE PERCENT: 0.4 %
BILIRUB SERPL-MCNC: 0.3 MG/DL (ref 0–1)
BILIRUBIN URINE: ABNORMAL
BLOOD, URINE: NEGATIVE
BUN BLDV-MCNC: 12 MG/DL (ref 7–20)
CALCIUM SERPL-MCNC: 9.2 MG/DL (ref 8.3–10.6)
CHLORIDE BLD-SCNC: 104 MMOL/L (ref 99–110)
CHOLESTEROL, TOTAL: 191 MG/DL (ref 0–199)
CLARITY: ABNORMAL
CO2: 27 MMOL/L (ref 21–32)
COLOR: ABNORMAL
COMMENT UA: ABNORMAL
CREAT SERPL-MCNC: 0.8 MG/DL (ref 0.6–1.1)
EOSINOPHILS ABSOLUTE: 0.1 K/UL (ref 0–0.6)
EOSINOPHILS RELATIVE PERCENT: 1.9 %
EPITHELIAL CELLS, UA: 9 /HPF (ref 0–5)
GFR AFRICAN AMERICAN: >60
GFR NON-AFRICAN AMERICAN: >60
GLOBULIN: 3.5 G/DL
GLUCOSE BLD-MCNC: 108 MG/DL (ref 70–99)
GLUCOSE URINE: NEGATIVE MG/DL
HCT VFR BLD CALC: 37.4 % (ref 36–48)
HDLC SERPL-MCNC: 50 MG/DL (ref 40–60)
HEMOGLOBIN: 12.4 G/DL (ref 12–16)
KETONES, URINE: ABNORMAL MG/DL
LDL CHOLESTEROL CALCULATED: 126 MG/DL
LEUKOCYTE ESTERASE, URINE: NEGATIVE
LYMPHOCYTES ABSOLUTE: 1.1 K/UL (ref 1–5.1)
LYMPHOCYTES RELATIVE PERCENT: 23.8 %
MCH RBC QN AUTO: 32.2 PG (ref 26–34)
MCHC RBC AUTO-ENTMCNC: 33.3 G/DL (ref 31–36)
MCV RBC AUTO: 96.8 FL (ref 80–100)
MICROSCOPIC EXAMINATION: YES
MONOCYTES ABSOLUTE: 0.3 K/UL (ref 0–1.3)
MONOCYTES RELATIVE PERCENT: 7.1 %
NEUTROPHILS ABSOLUTE: 3 K/UL (ref 1.7–7.7)
NEUTROPHILS RELATIVE PERCENT: 66.8 %
NITRITE, URINE: NEGATIVE
PDW BLD-RTO: 13.5 % (ref 12.4–15.4)
PH UA: 6 (ref 5–8)
PLATELET # BLD: 178 K/UL (ref 135–450)
PMV BLD AUTO: 10.4 FL (ref 5–10.5)
POTASSIUM SERPL-SCNC: 4.3 MMOL/L (ref 3.5–5.1)
PROTEIN UA: ABNORMAL MG/DL
RBC # BLD: 3.86 M/UL (ref 4–5.2)
RBC UA: 2 /HPF (ref 0–4)
SODIUM BLD-SCNC: 140 MMOL/L (ref 136–145)
SPECIFIC GRAVITY UA: >1.03 (ref 1–1.03)
TOTAL PROTEIN: 7.6 G/DL (ref 6.4–8.2)
TRIGL SERPL-MCNC: 76 MG/DL (ref 0–150)
TSH SERPL DL<=0.05 MIU/L-ACNC: 2.38 UIU/ML (ref 0.27–4.2)
URINE TYPE: ABNORMAL
UROBILINOGEN, URINE: 1 E.U./DL
VLDLC SERPL CALC-MCNC: 15 MG/DL
WBC # BLD: 4.5 K/UL (ref 4–11)
WBC UA: 16 /HPF (ref 0–5)

## 2020-11-30 PROCEDURE — 90471 IMMUNIZATION ADMIN: CPT | Performed by: FAMILY MEDICINE

## 2020-11-30 PROCEDURE — 90686 IIV4 VACC NO PRSV 0.5 ML IM: CPT | Performed by: FAMILY MEDICINE

## 2020-11-30 PROCEDURE — 99396 PREV VISIT EST AGE 40-64: CPT | Performed by: FAMILY MEDICINE

## 2020-11-30 PROCEDURE — 99213 OFFICE O/P EST LOW 20 MIN: CPT | Performed by: FAMILY MEDICINE

## 2020-11-30 RX ORDER — LORAZEPAM 0.5 MG/1
0.5 TABLET ORAL EVERY 6 HOURS PRN
Qty: 20 TABLET | Refills: 0 | Status: SHIPPED | OUTPATIENT
Start: 2020-11-30 | End: 2020-12-30

## 2020-11-30 RX ORDER — MELOXICAM 15 MG/1
7.5-15 TABLET ORAL DAILY PRN
Qty: 30 TABLET | Refills: 3 | Status: SHIPPED | OUTPATIENT
Start: 2020-11-30 | End: 2021-11-18 | Stop reason: SDUPTHER

## 2020-11-30 RX ORDER — FAMCICLOVIR 500 MG/1
TABLET, FILM COATED ORAL
Qty: 90 TABLET | Refills: 0 | Status: SHIPPED | OUTPATIENT
Start: 2020-11-30 | End: 2021-02-24

## 2020-11-30 RX ORDER — FAMCICLOVIR 250 MG/1
250 TABLET, FILM COATED ORAL DAILY
Qty: 90 TABLET | Refills: 3 | Status: SHIPPED | OUTPATIENT
Start: 2020-11-30 | End: 2021-02-28

## 2020-11-30 RX ORDER — AMLODIPINE BESYLATE 5 MG/1
5 TABLET ORAL DAILY
Qty: 90 TABLET | Refills: 3 | Status: SHIPPED | OUTPATIENT
Start: 2020-11-30 | End: 2021-11-18 | Stop reason: SDUPTHER

## 2020-11-30 RX ORDER — HYDROCHLOROTHIAZIDE 12.5 MG/1
TABLET ORAL
Qty: 90 TABLET | Refills: 3 | Status: SHIPPED | OUTPATIENT
Start: 2020-11-30 | End: 2021-07-22

## 2020-11-30 NOTE — PROGRESS NOTES
History and Physical      Chief Complaint:   Vin De La Rosa is a 50 y.o. female who presents for complete physical examination. Chief Complaint   Patient presents with    Annual Exam    Flu Vaccine       HPI:   Dizziness:  Present since 1998 - it was worse then. Flares in the fall. It comes and goes. Just feels a little woozy. It lasts a minutes to seconds and then passes. She sits down and it resolves. No other symptoms with it. Foot issues: seeing podiatry. Knot under L arm and L breast pain:  Noticed the knot 2 weeks ago. The pain has been going on for awhile. Frequency:  Has 16 ounces of coffee a day and taking HCTZ in the AM.     HTN:  Not checking BP. Taking norvasc and HCTZ. No symptoms concerning for end organ damage.      Fibromyalgia: Improving. Seeing rheumatology. Gabapentin made her feel sick. Stopped the Cymbalta. Takes mobic prn- rarely.      Anxiety/ADD:  Takes an ativan rarely if very anxious. Takes 1/2 pill prn generally. S/p evaluation by psychologist 2016- media. They recommended trial of treatment for anxiety and depression and then seeing how the inattention is doing. Takes the ativan prn- uses very sparingly. Hx of seeing therapist which didn't go well. Uterine fibroids: seeing GYN. Herpes: takes famvir 250mg daily and 500mg prn flares.      HM: Has a GYN.       SH: 11/2020: . [place] Working from home.        Vitals:    11/30/20 0942   BP: 132/82   Pulse: 86   SpO2: 98%   Weight: 162 lb (73.5 kg)   Height: 5' 2.75\" (1.594 m)       Wt Readings from Last 3 Encounters:   11/30/20 162 lb (73.5 kg)   03/05/20 169 lb (76.7 kg)   09/27/19 172 lb (78 kg)     BP Readings from Last 3 Encounters:   11/30/20 132/82   03/05/20 136/88   09/27/19 (!) 134/96       Patient Active Problem List   Diagnosis    Hypertension    Depression    Allergic rhinitis    Anxiety and depression    Herpes    Infertility, female    ADD (attention deficit disorder)    Fibromyalgia       Preventive Care:  Health Maintenance   Topic Date Due    Potassium monitoring  07/12/2020    Creatinine monitoring  07/12/2020    Cervical cancer screen  09/07/2023    DTaP/Tdap/Td vaccine (2 - Td) 03/19/2024    Lipid screen  07/12/2024    Flu vaccine  Completed    HIV screen  Completed    Hepatitis A vaccine  Aged Out    Hepatitis B vaccine  Aged Out    Hib vaccine  Aged Out    Meningococcal (ACWY) vaccine  Aged Out    Pneumococcal 0-64 years Vaccine  Aged [employer] History   Administered Date(s) Administered    Influenza, Quadv, IM, PF (6 mo and older Fluzone, Flulaval, Fluarix, and 3 yrs and older Afluria) 11/30/2020    Tdap (Boostrix, Adacel) 03/19/2014       Allergies   Allergen Reactions    Vicodin [Hydrocodone-Acetaminophen] Itching     Outpatient Medications Marked as Taking for the 11/30/20 encounter (Office Visit) with Sasha Fernández MD   Medication Sig Dispense Refill    meloxicam (MOBIC) 15 MG tablet Take 0.5-1 tablets by mouth daily as needed for Pain 30 tablet 3    LORazepam (ATIVAN) 0.5 MG tablet Take 1 tablet by mouth every 6 hours as needed for Anxiety for up to 30 days. 20 tablet 0    amLODIPine (NORVASC) 5 MG tablet Take 1 tablet by mouth daily 90 tablet 3    hydroCHLOROthiazide (HYDRODIURIL) 12.5 MG tablet TAKE 1 TABLET BY MOUTH EVERY DAY 90 tablet 3    famciclovir (FAMVIR) 500 MG tablet TAKE 1 TABLET BY MOUTH ONE TIME A DAY prn outbreak 90 tablet 0    famciclovir (FAMVIR) 250 MG tablet Take 1 tablet by mouth daily 90 tablet 3    fluticasone (FLONASE) 50 MCG/ACT nasal spray 2 sprays by Nasal route daily 1 Bottle 11    gabapentin (NEURONTIN) 100 MG capsule Take 100 mg by mouth.       vitamin D (ERGOCALCIFEROL) 28713 UNITS CAPS capsule Take 50,000 Units by mouth once a week         Past Medical History:   Diagnosis Date    Anxiety     Depression     Genital herpes     Hypertension     Infertility, female     has looked depression  Stable. Continue current regimen. Takes ativan rarely. Controlled Substance Monitoring:  I was not able to open today to review. Acute and Chronic Pain Monitoring:   RX Monitoring 9/13/2018   Attestation The Prescription Monitoring Report for this patient was reviewed today. Periodic Controlled Substance Monitoring Possible medication side effects, risk of tolerance/dependence & alternative treatments discussed. ;No signs of potential drug abuse or diversion identified.         - LORazepam (ATIVAN) 0.5 MG tablet; Take 1 tablet by mouth every 6 hours as needed for Anxiety for up to 30 days. Dispense: 20 tablet; Refill: 0    3. Fibromyalgia  Stable. Continue current regimen. 4. Pain in both hands  Persistent. Discussed options. Referral.   - Tali Yusuf MD, Hand Surgery (Hand, Wrist, Upper Extremity), [facility]    5. Frequency of urination  Stable. Urine studies. Cut back on caffeine.   - Culture, Urine; Future  - Urinalysis with Microscopic; Future    6. Hypertension, unspecified type  Stable. Continue current regimen. - amLODIPine (NORVASC) 5 MG tablet; Take 1 tablet by mouth daily  Dispense: 90 tablet; Refill: 3    7. Essential hypertension  Stable. Continue current regimen. - hydroCHLOROthiazide (HYDRODIURIL) 12.5 MG tablet; TAKE 1 TABLET BY MOUTH EVERY DAY  Dispense: 90 tablet; Refill: 3    8. Herpes  Stable. Continue current regimen.   - famciclovir (FAMVIR) 500 MG tablet; TAKE 1 TABLET BY MOUTH ONE TIME A DAY prn outbreak  Dispense: 90 tablet; Refill: 0    9. Dizziness  Stable. Labs. Discussed ent referral- pt plans to consider and let me know if she would like tof/u.   - TSH without Reflex; Future  - CBC Auto Differential; Future    10. Mastalgia  Stable but persistent . Unilateral.  Small lump in axilla- likely a boil- warm compresses. Call if not improving and will try an abx. For the mastalgia- mammogram and f/ul with breast specialist. Cut back on caffeine. - Maggy Douglas MD, Breast Surgery, [facility]      Discussed medications with patient, who voiced understanding of their use and indications. All questions answered. Return in about 1 year (around 11/30/2021) for Physical.         Documentation was done using voice recognition dragon software. Efforts were made to ensure accuracy; however, inadvertent, unintentional computerized transcription errors may be present. --Geri Holstein, MD on 11/30/2020    An electronic signature was used to authenticate this note.

## 2020-12-01 LAB — URINE CULTURE, ROUTINE: NORMAL

## 2020-12-04 ENCOUNTER — OFFICE VISIT (OUTPATIENT)
Dept: ORTHOPEDIC SURGERY | Age: 45
End: 2020-12-04
Payer: COMMERCIAL

## 2020-12-04 VITALS — TEMPERATURE: 97.7 F | WEIGHT: 162 LBS | RESPIRATION RATE: 16 BRPM | BODY MASS INDEX: 28.7 KG/M2 | HEIGHT: 63 IN

## 2020-12-04 PROCEDURE — 99243 OFF/OP CNSLTJ NEW/EST LOW 30: CPT | Performed by: ORTHOPAEDIC SURGERY

## 2020-12-04 PROCEDURE — 20550 NJX 1 TENDON SHEATH/LIGAMENT: CPT | Performed by: ORTHOPAEDIC SURGERY

## 2020-12-04 NOTE — Clinical Note
Dear  Josue Weinstein MD,    Thank you very much for your referral or Ms. Anderson Reyes to me for evaluation and treatment of her Hand & Wrist condition. I appreciate your confidence in me and thank you for allowing me the opportunity to care for your patients. If I can be of any further assistance to you on this or any other patient, please do not hesitate to contact me. Sincerely,    Ritika Avendaño.  Harish Ma MD

## 2020-12-04 NOTE — PROGRESS NOTES
Ms. Aura Pichardo is a 50 y.o. right handed woman  who is seen today in Hand Surgical Consultation at the request of Martin Brown MD.    She presents today regarding left symptoms which have been present for approximately 5 years. A history of antecedent trauma or injury is Absent. She reports symptoms to include moderate pain and stiffness with rare popping, catching or locking of the left Thumb. Finger symptoms are Frequently worse in the morning or overnight. She reports moderate pain located at the base of the symptomatic finger(s). Symptoms are worsening over time. Aura Pichardo reports somewhat generalized pain and stiffness of multiple of the other small joints of both hands and about the bases of the Thumbs bilaterally     Previous treatment has included conservative measures. She does not claim relation of her symptoms to her required work activities. She has not undergone any form of testing. I have today reviewed with Aura Pichardo the clinically relevant, past medical history, medications, allergies,  family history, social history, and Review Of Systems & I have documented any details relevant to today's presenting complaints in my history above. Ms. Yousif David's self-reported past medical history, medications, allergies,  family history, social history, and Review Of Systems have been scanned into the chart under the \"Media\" tab. Physical Exam:  Ms. Bhumi Umana most recent vitals:  Vitals  Temp: 97.7 °F (36.5 °C)  Temp Source: Infrared  Resp: 16  Height: 5' 3\" (160 cm)  Weight: 162 lb (73.5 kg)    She is well nourished, oriented to person, place & time. She demonstrates appropriate mood and affect as well as normal gait and station. Skin: Normal in appearance, Normal Color and Free of Lesions Bilaterally   Digital range of motion is limited by pain and limited by effort bilaterally. Inducible triggering is noted upon flexion in the left Thumb.   There is not with Ms. Lalitha Campbell regarding the treatment options available for her initially presenting Left Thumb stenosing tenosynovitis, which is causing her functional limitations. I have outlined for Ms. Lalitha Campbell the benefits and consequences of the various treatment modalities, including a reasonable expectation for the long term success and the likelihood that further more aggressive treatment may be required for her current presenting condition. Based upon our current discussion and a reasonable understating of the options available to her, Ms. Lalitha Campbell has selected to proceed with  an injection to the left Thumb Flexor Tendon Sheath. I have outlined for her the nature of the injection, and the pre, jeanette and post injection considerations and the appropriate expectations for this injection. I have clearly explained to her that the above outlined treatment plan should not be expected to 'cure' her stenosing tenosynovitis, but we are rather treating the symptoms with which she presents. She has understood that in order to achieve long lasting relief of her symptoms and to prevent future worsening or further damage, that definitive surgical treatment would be required. Ms. Lalitha Campbell  voiced an appropriate understanding of our discussion, the options available to her, and of the expectations of her selected  treatment. She did wish to proceed with Left Thumb Flexor Tendon Sheath injection. Procedure:  left Thumb Trigger Finger Injection  [first Injection]: After full discussion of the nature of this process and outlining a treatment plan with Ms. Lalitha Campbell, we discussed the complications, limitations, expectations, alternatives, and risks of injection of the flexor tendon sheath. She understood this information well and verbally consented to this treatment.     The skin of the symptomatic digit was prepped with Isopropyl Alcohol and under aseptic conditions the flexor tendon sheath was injected with a combination of 1/2 ml of 0.25% Bupivacaine without Epinephrine and 20 mg of Triamcinolone (40 mg/ml). Good filling of the flexor sheath was noted. A dry sterile bandage was applied and the patient tolerated the injection without difficulty. I advised the patient of the expected response, possible reactions and the instructions for care of the hand. As I do not find an etiology for Ms. Breezy David's symptoms in my evaluation of her hand & she has not responded as may be expected to my treatment modalities, I will refer  Ms. Jena Pastor back to Miriam Jung MD  for Rheumatologic evaluation to see if there is an ongoing problem at that level which may explain her presenting complaints. Ms. Jena Pastor has been given a full verbal list of instructions and precautions related to her present condition. I have asked her to followup with me in the office at the prescribed time. She is also specifically requested to call or return to the office sooner if her symptoms change or worsen prior to the next scheduled appointment.

## 2020-12-04 NOTE — PATIENT INSTRUCTIONS
Information & Instructions   After Finger, Hand, Wrist, or Elbow Injection    Echo Amaya MD    You have received an injection of local anesthetic (Bupivicaine without Epinephrine) for comfort & a steroid (Kenalog) for its strong anti-inflammatory effects. In order to give the medication a chance to reduce your inflammation and discomfort, it is recommended that you take it easy for a day or so. You may use your hand and arm as you feel comfortable, but you should avoid highly strenuous activity and heavy use for several days. Relief from the injection will often not begin for several days, and you may not feel full relief for up to one month. It is not uncommon to experience some local discomfort or pain at or around the injection site for a few days. To relieve these symptoms you may do the following if you feel necessary:       Apply ice to the affected area 20 minutes on and 20 minutes off. Do not apply ice directly to the skin. Use a thin layer (T-shirt, pillowcase, towel, etc.) to protect the skin. - If allowed by your other medical physicians, you may take -     2 Tylenol extra strength tablets every 4-6 hours       1-2 Aleve tablets twice a day     2-3 Advil tablets two to three times a day    If you are diabetic, the steroid medication may increase your blood sugar, so you are advised to monitor your sugar more closely so you can adjust it accordingly for a few days following your injection. If you need assistance with the control of you blood sugar, please contact you primary care physician for further advice. I will request that you please call the office one month after your injection at [phone] if you have not experienced relief of your symptoms (unless I have instructed you otherwise). If your injection has given you good relief of you symptoms as expected, then you only need to call the office if your symptoms return.

## 2021-02-24 DIAGNOSIS — B00.9 HERPES: ICD-10-CM

## 2021-02-24 RX ORDER — FAMCICLOVIR 500 MG/1
TABLET, FILM COATED ORAL
Qty: 90 TABLET | Refills: 0 | Status: SHIPPED | OUTPATIENT
Start: 2021-02-24 | End: 2021-11-18 | Stop reason: SDUPTHER

## 2021-03-25 ENCOUNTER — IMMUNIZATION (OUTPATIENT)
Dept: PRIMARY CARE CLINIC | Age: 46
End: 2021-03-25
Payer: COMMERCIAL

## 2021-03-25 PROCEDURE — 91301 COVID-19, MODERNA VACCINE 100MCG/0.5ML DOSE: CPT | Performed by: FAMILY MEDICINE

## 2021-03-25 PROCEDURE — 0011A PR IMM ADMN SARSCOV2 100 MCG/0.5 ML 1ST DOSE: CPT | Performed by: FAMILY MEDICINE

## 2021-04-22 ENCOUNTER — IMMUNIZATION (OUTPATIENT)
Dept: PRIMARY CARE CLINIC | Age: 46
End: 2021-04-22
Payer: COMMERCIAL

## 2021-04-22 PROCEDURE — 91301 COVID-19, MODERNA VACCINE 100MCG/0.5ML DOSE: CPT | Performed by: FAMILY MEDICINE

## 2021-04-22 PROCEDURE — 0012A PR IMM ADMN SARSCOV2 100 MCG/0.5 ML 2ND DOSE: CPT | Performed by: FAMILY MEDICINE

## 2021-07-22 DIAGNOSIS — I10 ESSENTIAL HYPERTENSION: ICD-10-CM

## 2021-07-22 RX ORDER — HYDROCHLOROTHIAZIDE 12.5 MG/1
TABLET ORAL
Qty: 90 TABLET | Refills: 3 | Status: SHIPPED | OUTPATIENT
Start: 2021-07-22 | End: 2021-11-18 | Stop reason: SDUPTHER

## 2021-07-22 NOTE — TELEPHONE ENCOUNTER
Medication:   Requested Prescriptions     Pending Prescriptions Disp Refills    hydroCHLOROthiazide (HYDRODIURIL) 12.5 MG tablet [Pharmacy Med Name: HYDROCHLOROTHIAZIDE 12.5 MG TB] 90 tablet 3     Sig: TAKE 1 TABLET BY MOUTH EVERY DAY        Last Filled:  11/30/20 #90 R3    Patient Phone Number: [phone] (home)     Last appt: 11/30/2020   Next appt: Visit date not found    Last OARRS:   RX Monitoring 9/13/2018   Attestation The Prescription Monitoring Report for this patient was reviewed today. Periodic Controlled Substance Monitoring Possible medication side effects, risk of tolerance/dependence & alternative treatments discussed. ;No signs of potential drug abuse or diversion identified.

## 2021-11-18 DIAGNOSIS — I10 ESSENTIAL HYPERTENSION: ICD-10-CM

## 2021-11-18 RX ORDER — FLUTICASONE PROPIONATE 50 MCG
2 SPRAY, SUSPENSION (ML) NASAL DAILY
Qty: 30 EACH | Refills: 2 | Status: SHIPPED | OUTPATIENT
Start: 2021-11-18 | End: 2022-03-29 | Stop reason: SDUPTHER

## 2021-11-18 RX ORDER — HYDROCHLOROTHIAZIDE 12.5 MG/1
TABLET ORAL
Qty: 90 TABLET | Refills: 3 | Status: SHIPPED | OUTPATIENT
Start: 2021-11-18 | End: 2022-03-29 | Stop reason: SDUPTHER

## 2021-11-18 NOTE — TELEPHONE ENCOUNTER
Medication:   Requested Prescriptions     Pending Prescriptions Disp Refills    fluticasone (FLONASE) 50 MCG/ACT nasal spray       Si sprays by Nasal route daily    hydroCHLOROthiazide (HYDRODIURIL) 12.5 MG tablet 90 tablet 3        Last Filled:    Flonase 2020 #1 bottle  w/11 RF  Hydrochlorothiazide 2021  #90 w/3 RF      Patient Phone Number: [phone] (home)     Last appt: 2020   Next appt: Visit date not found    Last OARRS:   RX Monitoring 2018   Attestation The Prescription Monitoring Report for this patient was reviewed today. Periodic Controlled Substance Monitoring Possible medication side effects, risk of tolerance/dependence & alternative treatments discussed. ;No signs of potential drug abuse or diversion identified.

## 2022-01-31 ENCOUNTER — OFFICE VISIT (OUTPATIENT)
Dept: ORTHOPEDIC SURGERY | Age: 47
End: 2022-01-31
Payer: COMMERCIAL

## 2022-01-31 VITALS — BODY MASS INDEX: 28.7 KG/M2 | HEIGHT: 63 IN | WEIGHT: 162 LBS | RESPIRATION RATE: 15 BRPM

## 2022-01-31 DIAGNOSIS — M65.312 TRIGGER FINGER OF LEFT THUMB: Primary | ICD-10-CM

## 2022-01-31 PROCEDURE — 99024 POSTOP FOLLOW-UP VISIT: CPT | Performed by: PHYSICIAN ASSISTANT

## 2022-01-31 PROCEDURE — G8484 FLU IMMUNIZE NO ADMIN: HCPCS | Performed by: PHYSICIAN ASSISTANT

## 2022-01-31 PROCEDURE — 1036F TOBACCO NON-USER: CPT | Performed by: PHYSICIAN ASSISTANT

## 2022-01-31 PROCEDURE — G8419 CALC BMI OUT NRM PARAM NOF/U: HCPCS | Performed by: PHYSICIAN ASSISTANT

## 2022-01-31 PROCEDURE — 20550 NJX 1 TENDON SHEATH/LIGAMENT: CPT | Performed by: PHYSICIAN ASSISTANT

## 2022-01-31 PROCEDURE — G8427 DOCREV CUR MEDS BY ELIG CLIN: HCPCS | Performed by: PHYSICIAN ASSISTANT

## 2022-01-31 RX ORDER — TRIAMCINOLONE ACETONIDE 40 MG/ML
20 INJECTION, SUSPENSION INTRA-ARTICULAR; INTRAMUSCULAR ONCE
Status: COMPLETED | OUTPATIENT
Start: 2022-01-31 | End: 2022-01-31

## 2022-01-31 RX ADMIN — TRIAMCINOLONE ACETONIDE 20 MG: 40 INJECTION, SUSPENSION INTRA-ARTICULAR; INTRAMUSCULAR at 14:32

## 2022-01-31 NOTE — PATIENT INSTRUCTIONS
Information & Instructions   After Finger, Hand, Wrist, or Elbow Injection    Romario Carlson MD    You have received an injection of local anesthetic (Bupivicaine without Epinephrine) for comfort & a steroid (Kenalog) for its strong anti-inflammatory effects. In order to give the medication a chance to reduce your inflammation and discomfort, it is recommended that you take it easy for a day or so. You may use your hand and arm as you feel comfortable, but you should avoid highly strenuous activity and heavy use for several days. Relief from the injection will often not begin for several days, and you may not feel full relief for up to one month. It is not uncommon to experience some local discomfort or pain at or around the injection site for a few days. To relieve these symptoms you may do the following if you feel necessary:       Apply ice to the affected area 20 minutes on and 20 minutes off. Do not apply ice directly to the skin. Use a thin layer (T-shirt, pillowcase, towel, etc.) to protect the skin. - If allowed by your other medical physicians, you may take -     2 Tylenol extra strength tablets every 4-6 hours       1-2 Aleve tablets twice a day     2-3 Advil tablets two to three times a day    If you are diabetic, the steroid medication may increase your blood sugar, so you are advised to monitor your sugar more closely so you can adjust it accordingly for a few days following your injection. If you need assistance with the control of you blood sugar, please contact you primary care physician for further advice. I will request that you please call the office one month after your injection at [phone] if you have not experienced relief of your symptoms (unless I have instructed you otherwise). If your injection has given you good relief of you symptoms as expected, then you only need to call the office if your symptoms return.

## 2022-01-31 NOTE — PROGRESS NOTES
Ms. Florin Nicholson returns today in follow-up of her previously treated  left Thumb stenosing tenosynovitis. She was last seen by Dr. Maria Dolores Valentine. Jackie Harp in December, 2020 at which time she was treated with Steroid injection to the Left, Thumb, Flexor Tendon Sheath. She experienced moderate relief of her initial symptoms. She  has noticed symptom worsening over the last several months. She returns today with worsened symptoms of left Thumb pain, swelling and catching, requesting further treatment. I have today reviewed with Florin Nicholson the clinically relevant, past medical history, medications, allergies,  family history, social history, and Review Of Systems & I have documented any details relevant to today's presenting complaints in my history above. Ms. Tawana David's self-reported past medical history, medications, allergies,  family history, social history, and Review Of Systems have been scanned into the chart under the \"Media\" tab. Physical Exam:  Vitals  Resp: 15  Height: 5' 3\" (160 cm)  Weight: 162 lb (73.5 kg)  Ms. Florin Nicholson appears well, she is in no apparent distress, she demonstrates appropriate mood & affect. Skin: Normal in appearance, Normal Color and Free of Lesions Bilaterally   Digital range of motion is limited by pain on the Left, normal on the Right. Inducible triggering is noted upon flexion in the left Thumb. There is not an associated flexion contracture of the PIP joint. No other digit demonstrates evidence for stenosing tenosynovitis bilaterally. Wrist range of motion is without significant limitation bilaterally  There is no evidence of gross joint instability bilaterally. Sensation is subjectively normal bilaterally  Vascular examination reveals normal, good capillary refill and good color bilaterally  Swelling is mild in the symptomatic digit(s) on the Left, normal on the Right  Muscular strength is clinically appropriate bilaterally.   Examination for Stenosing Tenosynovitis demonstrates mild tenderness, thickening & nodularity at the A-1 pulley(s) of the Left Thumb. There is a palpable Nota's Node. There is Inducible triggering on active flexion with pain. No other digits demonstrate evidence of Stenosing Tenosynovitis. Impression:  Ms. Robles Michel is showing evidence of persistent Stenosing Tenosynovitis (Trigger Finger) after previous treatment. She requests additional treatment at this time. Plan:  I have had a thorough discussion with Ms. Robles Michel regarding the treatment options available for her worsened Left Thumb stenosing tenosynovitis, which is causing her functional limitations. I have outlined for Ms. Robles Michel the the various treatment modalities available to her, including the options and utility of the  judicious use of over-the-counter anti-inflammatory medications (if allowed by her primary care physician), the temporary relief afforded by injection of corticosteroids, and the more definitive option of surgical treatment for this problem if she feels that the duration or severity of her symptoms merits surgical intervention. I have explained  the reasonable expectations for the long term success of each option and the likelihood that further more aggressive treatment could be required for her current presenting condition. Based upon our current discussion and a reasonable understating of the options available to her, Ms. Robles Michel has selected to proceed with  an injection to the left Thumb Flexor Tendon Sheath. I have outlined for her the nature of the injection, and the pre, jeanette and post injection considerations and the appropriate expectations for this injection. I have clearly explained to her that the above outlined treatment plan should not be expected to 'cure' her stenosing tenosynovitis, but we are rather treating the symptoms with which she presents.   She has understood that in order to achieve long lasting relief of her symptoms and to prevent future worsening or further damage, that definitive surgical treatment would be required. Ms. Kirk Dennis  voiced an appropriate understanding of our discussion, the options available to her, and of the expectations of her selected  treatment. She did wish to proceed with Left Thumb Flexor Tendon Sheath injection. Procedure:  left Thumb Trigger Finger Injection  [second Injection]: After full discussion of the nature of this process and outlining a treatment plan with Ms. Kirk Dennis, we discussed the complications, limitations, expectations, alternatives, and risks of injection of the flexor tendon sheath. I have explained the potential for bleeding, infection, potential side effects of the medication, and the remote possibility of damage to surrounding structures as result of the injection. She understood this information well and verbally consented to this treatment. The skin of the symptomatic digit was prepped with Isopropyl Alcohol and under aseptic conditions the flexor tendon sheath was injected with a combination of 1/2 ml of 0.25% Bupivacaine without Epinephrine and 20 mg of Triamcinolone (40 mg/ml). Good filling of the flexor sheath was noted. A dry sterile bandage was applied and the patient tolerated the injection without difficulty. I advised the patient of the expected response, possible reactions and the instructions for care of the hand. I have also discussed with Ms. Kirk Dennis the other treatment options available to her for this condition. We have today selected to proceed with treatment by injection with steroid medication.   She and I have agreed that if our current course of Injection treatment does not prove to be effective over the short term future, that she will schedule a follow-up appointment to discuss and select an alternate course of therapy including possibly further conservative treatment or surgical treatment. Ms. Jessa Luong has been given a full verbal list of instructions and precautions related to her present condition. I have asked her to followup with me in the office at the prescribed time. She is also specifically requested to call or return to the office sooner if her symptoms change or worsen prior to the next scheduled appointment.

## 2022-03-07 DIAGNOSIS — B00.9 HERPES: ICD-10-CM

## 2022-03-07 DIAGNOSIS — I10 HYPERTENSION, UNSPECIFIED TYPE: ICD-10-CM

## 2022-03-07 DIAGNOSIS — I10 ESSENTIAL HYPERTENSION: ICD-10-CM

## 2022-03-08 NOTE — TELEPHONE ENCOUNTER
Medication:   Requested Prescriptions     Pending Prescriptions Disp Refills    amLODIPine (NORVASC) 5 MG tablet 90 tablet 3     Sig: Take 1 tablet by mouth daily    famciclovir (FAMVIR) 500 MG tablet 90 tablet 0     Sig: TAKE 1 TABLET BY MOUTH ONE TIME A DAY AS NEEDED FOR OUTBREAK    hydroCHLOROthiazide (HYDRODIURIL) 12.5 MG tablet 90 tablet 3     Sig: TAKE 1 TABLET BY MOUTH EVERY DAY        Last Filled:  11/18/2021 #90 Refills 3  11/18/2021 #90 Refills 0  11/18/2021 #90 Refills 3    Patient Phone Number: [phone] (home)     Last appt: 11/30/2020   Return in about 1 year (around 11/30/2021) for Physical.    Next appt: Visit date not found ( Appointment Reminder Sent )    Last OARRS:   RX Monitoring 9/13/2018   Attestation The Prescription Monitoring Report for this patient was reviewed today. Periodic Controlled Substance Monitoring Possible medication side effects, risk of tolerance/dependence & alternative treatments discussed. ;No signs of potential drug abuse or diversion identified.      Patient needs to schedule an appointment

## 2022-03-08 NOTE — TELEPHONE ENCOUNTER
Patient is due for an appointment. Please have them do a visit prior to refill. Once scheduled if they will be out of their medication prior to the visit let me know.

## 2022-03-11 RX ORDER — FAMCICLOVIR 500 MG/1
TABLET, FILM COATED ORAL
Qty: 90 TABLET | Refills: 0 | OUTPATIENT
Start: 2022-03-11

## 2022-03-11 RX ORDER — AMLODIPINE BESYLATE 5 MG/1
5 TABLET ORAL DAILY
Qty: 90 TABLET | Refills: 3 | OUTPATIENT
Start: 2022-03-11

## 2022-03-11 RX ORDER — HYDROCHLOROTHIAZIDE 12.5 MG/1
TABLET ORAL
Qty: 90 TABLET | Refills: 3 | OUTPATIENT
Start: 2022-03-11

## 2022-03-11 NOTE — TELEPHONE ENCOUNTER
Last OV 11/30/2020   Next OV Visit date not found    Requested Prescriptions     Pending Prescriptions Disp Refills    amLODIPine (NORVASC) 5 MG tablet 90 tablet 3     Sig: Take 1 tablet by mouth daily    famciclovir (FAMVIR) 500 MG tablet 90 tablet 0     Sig: TAKE 1 TABLET BY MOUTH ONE TIME A DAY AS NEEDED FOR OUTBREAK    hydroCHLOROthiazide (HYDRODIURIL) 12.5 MG tablet 90 tablet 3     Sig: TAKE 1 TABLET BY MOUTH EVERY DAY    pt made aware that appointment needed

## 2022-03-29 ENCOUNTER — TELEMEDICINE (OUTPATIENT)
Dept: FAMILY MEDICINE CLINIC | Age: 47
End: 2022-03-29
Payer: COMMERCIAL

## 2022-03-29 DIAGNOSIS — I10 HYPERTENSION, UNSPECIFIED TYPE: ICD-10-CM

## 2022-03-29 DIAGNOSIS — Z91.09 ENVIRONMENTAL ALLERGIES: Primary | ICD-10-CM

## 2022-03-29 DIAGNOSIS — B00.9 HERPES: ICD-10-CM

## 2022-03-29 DIAGNOSIS — I10 ESSENTIAL HYPERTENSION: ICD-10-CM

## 2022-03-29 PROCEDURE — 99214 OFFICE O/P EST MOD 30 MIN: CPT | Performed by: FAMILY MEDICINE

## 2022-03-29 PROCEDURE — G8484 FLU IMMUNIZE NO ADMIN: HCPCS | Performed by: FAMILY MEDICINE

## 2022-03-29 PROCEDURE — G8427 DOCREV CUR MEDS BY ELIG CLIN: HCPCS | Performed by: FAMILY MEDICINE

## 2022-03-29 PROCEDURE — G8419 CALC BMI OUT NRM PARAM NOF/U: HCPCS | Performed by: FAMILY MEDICINE

## 2022-03-29 PROCEDURE — 1036F TOBACCO NON-USER: CPT | Performed by: FAMILY MEDICINE

## 2022-03-29 RX ORDER — HYDROCHLOROTHIAZIDE 12.5 MG/1
TABLET ORAL
Qty: 90 TABLET | Refills: 1 | Status: SHIPPED | OUTPATIENT
Start: 2022-03-29

## 2022-03-29 RX ORDER — FLUTICASONE PROPIONATE 50 MCG
2 SPRAY, SUSPENSION (ML) NASAL DAILY
Qty: 1 EACH | Refills: 1 | Status: SHIPPED | OUTPATIENT
Start: 2022-03-29

## 2022-03-29 RX ORDER — FAMCICLOVIR 250 MG/1
TABLET, FILM COATED ORAL
Qty: 90 TABLET | Refills: 1 | Status: SHIPPED | OUTPATIENT
Start: 2022-03-29

## 2022-03-29 RX ORDER — FAMCICLOVIR 500 MG/1
TABLET, FILM COATED ORAL
Qty: 30 TABLET | Refills: 1 | Status: SHIPPED | OUTPATIENT
Start: 2022-03-29

## 2022-03-29 RX ORDER — AMLODIPINE BESYLATE 5 MG/1
5 TABLET ORAL DAILY
Qty: 90 TABLET | Refills: 1 | Status: SHIPPED | OUTPATIENT
Start: 2022-03-29

## 2022-03-29 NOTE — PROGRESS NOTES
TELEHEALTH EVALUATION -- Audio/Visual (During CKNJL-22 public health emergency)    Nasir Campa   YOB: 1975    Date of Visit:  3/29/2022    Allergies   Allergen Reactions    Vicodin [Hydrocodone-Acetaminophen] Itching     Current Outpatient Medications on File Prior to Visit   Medication Sig Dispense Refill    meloxicam (MOBIC) 15 MG tablet Take 0.5-1 tablets by mouth daily as needed for Pain 30 tablet 3    gabapentin (NEURONTIN) 100 MG capsule Take 100 mg by mouth.  vitamin D (ERGOCALCIFEROL) 78265 UNITS CAPS capsule Take 50,000 Units by mouth once a week       No current facility-administered medications on file prior to visit. Wt Readings from Last 3 Encounters:   22 162 lb (73.5 kg)   20 162 lb (73.5 kg)   20 162 lb (73.5 kg)     BP Readings from Last 3 Encounters:   20 132/82   20 136/88   19 (!) 134/96          Nasir Campa (:  1975) has requested to and consented to an audio/video evaluation for the concerns or medical issues discussed below. Chief Complaint   Patient presents with    Hypertension       HPI    Patient has moved - planning to find a new PCP but needs refills of her medications until she can get in. HTN:  Not checking BP. Taking norvasc and HCTZ although ran out about a month ago but thinks she needs them. No symptoms concerning for end organ damage.     Allergies: needs refill of the flonase. Fibromyalgia: Stable. Hx of seeing rheumatology. Gabapentin made her feel sick. Stopped the Cymbalta. Takes mobic prn- rarely.      Anxiety/ADD:  Takes an ativan rarely if very anxious. Takes 1/2 pill prn generally. S/p evaluation by psychologist 2016- media. Uterine fibroids: seeing GYN.      Herpes: takes famvir 250mg daily and 500mg prn flares.      HM: Has a GYN.       SH: 2020: . [place] Working from home.      Social History     Socioeconomic History    Marital status: Single     Spouse name: Not on file    Number of children: Not on file    Years of education: Not on file    Highest education level: Not on file   Occupational History    Not on file   Tobacco Use    Smoking status: Never Smoker    Smokeless tobacco: Never Used   Vaping Use    Vaping Use: Never used   Substance and Sexual Activity    Alcohol use: Yes     Comment: occasional    Drug use: No    Sexual activity: Not on file   Other Topics Concern    Not on file   Social History Narrative    03/19/2014     back home and no longer working. Works at [employer].  works out of town at [employer] and comes home for short periods of times. Social Determinants of Health     Financial Resource Strain:     Difficulty of Paying Living Expenses: Not on file   Food Insecurity:     Worried About Running Out of Food in the Last Year: Not on file    Marquita of Food in the Last Year: Not on file   Transportation Needs:     Lack of Transportation (Medical): Not on file    Lack of Transportation (Non-Medical):  Not on file   Physical Activity:     Days of Exercise per Week: Not on file    Minutes of Exercise per Session: Not on file   Stress:     Feeling of Stress : Not on file   Social Connections:     Frequency of Communication with Friends and Family: Not on file    Frequency of Social Gatherings with Friends and Family: Not on file    Attends Hindu Services: Not on file    Active Member of [address] [place] [address] or Organizations: Not on file    Attends Club or Organization Meetings: Not on file    Marital Status: Not on file   Intimate Partner Violence:     Fear of Current or Ex-Partner: Not on file    Emotionally Abused: Not on file    Physically Abused: Not on file    Sexually Abused: Not on file   Housing Stability:     Unable to Pay for Housing in the Last Year: Not on file    Number of [place] in the Last Year: Not on file    Unstable Housing in the Last Year: Not on file         Review of Systems  As documented in the HPI. Currently no complaints of CP or KATARINA. Vital Signs: (As obtained by patient/caregiver or practitioner observation)    There were no vitals taken for this visit. No flowsheet data found. Physical Exam  Constitutional:       General: She is not in acute distress. Appearance: Normal appearance. She is not ill-appearing. HENT:      Head: Normocephalic and atraumatic. Nose: Nose normal.   Pulmonary:      Effort: Pulmonary effort is normal. No respiratory distress. Neurological:      General: No focal deficit present. Mental Status: She is alert. Psychiatric:         Mood and Affect: Mood normal.         Behavior: Behavior normal.           Assessment/Plan     1. Hypertension, unspecified type  Stable. Continue current regimen. Advised to monitor BP and let me know if there are any issues. Should also do labs once back on her meds. BMP ordered. - amLODIPine (NORVASC) 5 MG tablet; Take 1 tablet by mouth daily  Dispense: 90 tablet; Refill: 1  - hydroCHLOROthiazide (HYDRODIURIL) 12.5 MG tablet; TAKE 1 TABLET BY MOUTH EVERY DAY  Dispense: 90 tablet; Refill: 1    2. Herpes  Stable. Continue current regimen.   - famciclovir (FAMVIR) 250 MG tablet; TAKE 1 TABLET BY MOUTH ONE TIME A DAY  Dispense: 90 tablet; Refill: 1  - famciclovir (FAMVIR) 500 MG tablet; Take 1gm BID for one day PO as needed for herpes outbreak. Dispense: 30 tablet; Refill: 1      4. Environmental allergies  Stable. Continue current regimen.   - fluticasone (FLONASE) 50 MCG/ACT nasal spray; 2 sprays by Nasal route daily  Dispense: 1 each; Refill: 1      Discussed medications with patient, who voiced understanding of their use and indications. All questions answered. Return for f/u with new PCP prior to next refills. Pleasant Asper is being evaluated by a Virtual Visit (video visit) encounter to address concerns as mentioned above.   A caregiver was present when appropriate. Due to this being a TeleHealth encounter (During ASBAM-21 public health emergency), evaluation of the following organ systems was limited: Vitals/Constitutional/EENT/Resp/CV/GI//MS/Neuro/Skin/Heme-Lymph-Imm. Pursuant to the emergency declaration under the [facility], [phone] waiver authority and the [employer] Act, this Virtual Visit was conducted with patient's (and/or legal guardian's) consent, to reduce the patient's risk of exposure to COVID-19 and provide necessary medical care. The patient (and/or legal guardian) has also been advised to contact this office for worsening conditions or problems, and seek emergency medical treatment and/or call 911 if deemed necessary. The patient and no one were present for the visit. Patient identification was verified at the start of the visit: Yes    Total time spent on this encounter: Not billed by time. Services were provided through a video synchronous discussion virtually to substitute for in-person clinic visit. Documentation was done using voice recognition dragon software. Efforts were made to ensure accuracy; however, inadvertent, unintentional computerized transcription errors may be present. --Cristi Lawson MD on 3/29/2022    An electronic signature was used to authenticate this note.

## 2024-05-06 DIAGNOSIS — B00.9 HERPES: ICD-10-CM

## 2024-05-07 RX ORDER — FAMCICLOVIR 250 MG/1
TABLET ORAL
Qty: 90 TABLET | Refills: 1 | OUTPATIENT
Start: 2024-05-07

## 2024-05-07 NOTE — TELEPHONE ENCOUNTER
Medication:   Requested Prescriptions     Pending Prescriptions Disp Refills    famciclovir (FAMVIR) 250 MG tablet [Pharmacy Med Name: FAMCICLOVIR 250MG TABLETS] 90 tablet 1     Sig: TAKE 1 TABLET BY MOUTH EVERY DAY        Last Filled:  03/29/2022 #90 1rf     Patient Phone Number: [phone] (home)     Last appt: 3/29/2022   Next appt: Visit date not found    Last OARRS:       9/13/2018    11:05 AM   RX Monitoring   Attestation The Prescription Monitoring Report for this patient was reviewed today.   Periodic Controlled Substance Monitoring Possible medication side effects, risk of tolerance/dependence & alternative treatments discussed.;No signs of potential drug abuse or diversion identified.